# Patient Record
Sex: MALE | Race: WHITE | Employment: UNEMPLOYED | URBAN - METROPOLITAN AREA
[De-identification: names, ages, dates, MRNs, and addresses within clinical notes are randomized per-mention and may not be internally consistent; named-entity substitution may affect disease eponyms.]

---

## 2019-03-06 ENCOUNTER — APPOINTMENT (OUTPATIENT)
Dept: GENERAL RADIOLOGY | Age: 29
End: 2019-03-06
Payer: COMMERCIAL

## 2019-03-06 ENCOUNTER — HOSPITAL ENCOUNTER (EMERGENCY)
Age: 29
Discharge: HOME | End: 2019-03-06
Payer: COMMERCIAL

## 2019-03-06 VITALS
RESPIRATION RATE: 18 BRPM | WEIGHT: 160 LBS | BODY MASS INDEX: 21.2 KG/M2 | HEIGHT: 73 IN | TEMPERATURE: 98 F | SYSTOLIC BLOOD PRESSURE: 140 MMHG | OXYGEN SATURATION: 100 % | DIASTOLIC BLOOD PRESSURE: 70 MMHG | HEART RATE: 81 BPM

## 2019-03-06 DIAGNOSIS — S46.911A STRAIN OF RIGHT SHOULDER, INITIAL ENCOUNTER: Primary | ICD-10-CM

## 2019-03-06 PROCEDURE — 99283 EMERGENCY DEPT VISIT LOW MDM: CPT

## 2019-03-06 PROCEDURE — 73030 X-RAY EXAM OF SHOULDER: CPT

## 2019-03-06 RX ORDER — NAPROXEN 500 MG/1
500 TABLET ORAL 2 TIMES DAILY WITH MEALS
Qty: 20 TABLET | Refills: 0 | Status: SHIPPED | OUTPATIENT
Start: 2019-03-06 | End: 2019-05-05 | Stop reason: ALTCHOICE

## 2019-03-06 RX ORDER — CYCLOBENZAPRINE HCL 10 MG
10 TABLET ORAL 3 TIMES DAILY PRN
Qty: 10 TABLET | Refills: 0 | Status: SHIPPED | OUTPATIENT
Start: 2019-03-06 | End: 2019-05-05 | Stop reason: ALTCHOICE

## 2019-03-06 ASSESSMENT — ENCOUNTER SYMPTOMS
NAUSEA: 0
DIARRHEA: 0
SORE THROAT: 0
COLOR CHANGE: 0
RHINORRHEA: 0
SHORTNESS OF BREATH: 0
EYE PAIN: 0
VOMITING: 0
BACK PAIN: 0
ABDOMINAL PAIN: 0
EYE REDNESS: 0
COUGH: 0
EYE ITCHING: 0
TROUBLE SWALLOWING: 0
VOICE CHANGE: 0
WHEEZING: 0

## 2019-03-06 ASSESSMENT — PAIN DESCRIPTION - ONSET: ONSET: ON-GOING

## 2019-03-06 ASSESSMENT — PAIN DESCRIPTION - FREQUENCY: FREQUENCY: CONTINUOUS

## 2019-03-06 ASSESSMENT — PAIN DESCRIPTION - PAIN TYPE: TYPE: ACUTE PAIN

## 2019-03-06 ASSESSMENT — PAIN SCALES - GENERAL: PAINLEVEL_OUTOF10: 10

## 2019-03-06 ASSESSMENT — PAIN DESCRIPTION - DESCRIPTORS: DESCRIPTORS: ACHING;DISCOMFORT;CONSTANT

## 2019-03-06 ASSESSMENT — PAIN DESCRIPTION - ORIENTATION: ORIENTATION: RIGHT

## 2019-03-06 ASSESSMENT — PAIN DESCRIPTION - PROGRESSION: CLINICAL_PROGRESSION: NOT CHANGED

## 2019-05-05 ENCOUNTER — HOSPITAL ENCOUNTER (INPATIENT)
Age: 29
LOS: 5 days | Discharge: NURSING, REHAB OR LONG-TERM CARE | DRG: 753 | End: 2019-05-10
Attending: STUDENT IN AN ORGANIZED HEALTH CARE EDUCATION/TRAINING PROGRAM | Admitting: PSYCHIATRY & NEUROLOGY
Payer: COMMERCIAL

## 2019-05-05 ENCOUNTER — APPOINTMENT (OUTPATIENT)
Dept: GENERAL RADIOLOGY | Age: 29
DRG: 753 | End: 2019-05-05
Payer: COMMERCIAL

## 2019-05-05 ENCOUNTER — APPOINTMENT (OUTPATIENT)
Dept: CT IMAGING | Age: 29
DRG: 753 | End: 2019-05-05
Payer: COMMERCIAL

## 2019-05-05 DIAGNOSIS — F31.9 BIPOLAR 1 DISORDER (HCC): Primary | ICD-10-CM

## 2019-05-05 DIAGNOSIS — S09.90XA CLOSED HEAD INJURY, INITIAL ENCOUNTER: ICD-10-CM

## 2019-05-05 DIAGNOSIS — S01.81XA FACIAL LACERATION, INITIAL ENCOUNTER: ICD-10-CM

## 2019-05-05 LAB
ACETAMINOPHEN LEVEL: <5 UG/ML (ref 10–30)
ALBUMIN SERPL-MCNC: 4.8 G/DL (ref 3.5–4.6)
ALP BLD-CCNC: 55 U/L (ref 35–104)
ALT SERPL-CCNC: 24 U/L (ref 0–41)
ANION GAP SERPL CALCULATED.3IONS-SCNC: 14 MEQ/L (ref 9–15)
AST SERPL-CCNC: 33 U/L (ref 0–40)
BASOPHILS ABSOLUTE: 0 K/UL (ref 0–0.2)
BASOPHILS RELATIVE PERCENT: 0.2 %
BILIRUB SERPL-MCNC: 0.5 MG/DL (ref 0.2–0.7)
BUN BLDV-MCNC: 11 MG/DL (ref 6–20)
CALCIUM SERPL-MCNC: 8.7 MG/DL (ref 8.5–9.9)
CHLORIDE BLD-SCNC: 101 MEQ/L (ref 95–107)
CK MB: 5.4 NG/ML (ref 0–6.7)
CO2: 24 MEQ/L (ref 20–31)
CREAT SERPL-MCNC: 0.9 MG/DL (ref 0.7–1.2)
CREATINE KINASE-MB INDEX: 0.8 % (ref 0–3.5)
EOSINOPHILS ABSOLUTE: 0 K/UL (ref 0–0.7)
EOSINOPHILS RELATIVE PERCENT: 0.1 %
ETHANOL PERCENT: 0.04 G/DL
ETHANOL: 49 MG/DL (ref 0–0.08)
GFR AFRICAN AMERICAN: >60
GFR NON-AFRICAN AMERICAN: >60
GLOBULIN: 2.4 G/DL (ref 2.3–3.5)
GLUCOSE BLD-MCNC: 71 MG/DL (ref 70–99)
HCT VFR BLD CALC: 43.1 % (ref 42–52)
HEMOGLOBIN: 14.6 G/DL (ref 14–18)
LYMPHOCYTES ABSOLUTE: 1.8 K/UL (ref 1–4.8)
LYMPHOCYTES RELATIVE PERCENT: 16.3 %
MCH RBC QN AUTO: 30.2 PG (ref 27–31.3)
MCHC RBC AUTO-ENTMCNC: 34 % (ref 33–37)
MCV RBC AUTO: 88.8 FL (ref 80–100)
MONOCYTES ABSOLUTE: 0.9 K/UL (ref 0.2–0.8)
MONOCYTES RELATIVE PERCENT: 7.6 %
NEUTROPHILS ABSOLUTE: 8.6 K/UL (ref 1.4–6.5)
NEUTROPHILS RELATIVE PERCENT: 75.8 %
PDW BLD-RTO: 13.1 % (ref 11.5–14.5)
PLATELET # BLD: 271 K/UL (ref 130–400)
POTASSIUM SERPL-SCNC: 4.9 MEQ/L (ref 3.4–4.9)
RBC # BLD: 4.85 M/UL (ref 4.7–6.1)
SALICYLATE, SERUM: <0.3 MG/DL (ref 15–30)
SODIUM BLD-SCNC: 139 MEQ/L (ref 135–144)
TOTAL CK: 711 U/L (ref 0–190)
TOTAL PROTEIN: 7.2 G/DL (ref 6.3–8)
TSH SERPL DL<=0.05 MIU/L-ACNC: 1.68 UIU/ML (ref 0.44–3.86)
WBC # BLD: 11.4 K/UL (ref 4.8–10.8)

## 2019-05-05 PROCEDURE — 1240000000 HC EMOTIONAL WELLNESS R&B

## 2019-05-05 PROCEDURE — G0480 DRUG TEST DEF 1-7 CLASSES: HCPCS

## 2019-05-05 PROCEDURE — 72110 X-RAY EXAM L-2 SPINE 4/>VWS: CPT

## 2019-05-05 PROCEDURE — 70450 CT HEAD/BRAIN W/O DYE: CPT

## 2019-05-05 PROCEDURE — 85025 COMPLETE CBC W/AUTO DIFF WBC: CPT

## 2019-05-05 PROCEDURE — 71046 X-RAY EXAM CHEST 2 VIEWS: CPT

## 2019-05-05 PROCEDURE — 2500000003 HC RX 250 WO HCPCS: Performed by: STUDENT IN AN ORGANIZED HEALTH CARE EDUCATION/TRAINING PROGRAM

## 2019-05-05 PROCEDURE — 72125 CT NECK SPINE W/O DYE: CPT

## 2019-05-05 PROCEDURE — 70486 CT MAXILLOFACIAL W/O DYE: CPT

## 2019-05-05 PROCEDURE — 72170 X-RAY EXAM OF PELVIS: CPT

## 2019-05-05 PROCEDURE — 6370000000 HC RX 637 (ALT 250 FOR IP): Performed by: STUDENT IN AN ORGANIZED HEALTH CARE EDUCATION/TRAINING PROGRAM

## 2019-05-05 PROCEDURE — 84443 ASSAY THYROID STIM HORMONE: CPT

## 2019-05-05 PROCEDURE — 12015 RPR F/E/E/N/L/M 7.6-12.5 CM: CPT

## 2019-05-05 PROCEDURE — 72074 X-RAY EXAM THORAC SPINE4/>VW: CPT

## 2019-05-05 PROCEDURE — 36415 COLL VENOUS BLD VENIPUNCTURE: CPT

## 2019-05-05 PROCEDURE — 80053 COMPREHEN METABOLIC PANEL: CPT

## 2019-05-05 PROCEDURE — 82550 ASSAY OF CK (CPK): CPT

## 2019-05-05 PROCEDURE — 82553 CREATINE MB FRACTION: CPT

## 2019-05-05 PROCEDURE — 99285 EMERGENCY DEPT VISIT HI MDM: CPT

## 2019-05-05 PROCEDURE — 6370000000 HC RX 637 (ALT 250 FOR IP): Performed by: PSYCHIATRY & NEUROLOGY

## 2019-05-05 RX ORDER — TRAZODONE HYDROCHLORIDE 50 MG/1
50 TABLET ORAL NIGHTLY PRN
Status: DISCONTINUED | OUTPATIENT
Start: 2019-05-05 | End: 2019-05-10 | Stop reason: HOSPADM

## 2019-05-05 RX ORDER — VENLAFAXINE 100 MG/1
600 TABLET ORAL EVERY EVENING
Status: ON HOLD | COMMUNITY
End: 2019-05-10 | Stop reason: HOSPADM

## 2019-05-05 RX ORDER — HALOPERIDOL 5 MG/ML
5 INJECTION INTRAMUSCULAR EVERY 6 HOURS PRN
Status: DISCONTINUED | OUTPATIENT
Start: 2019-05-05 | End: 2019-05-10 | Stop reason: HOSPADM

## 2019-05-05 RX ORDER — NICOTINE 21 MG/24HR
1 PATCH, TRANSDERMAL 24 HOURS TRANSDERMAL DAILY
Status: DISCONTINUED | OUTPATIENT
Start: 2019-05-06 | End: 2019-05-10 | Stop reason: HOSPADM

## 2019-05-05 RX ORDER — NALTREXONE HYDROCHLORIDE 50 MG/1
50 TABLET, FILM COATED ORAL DAILY
Status: ON HOLD | COMMUNITY
End: 2019-05-10 | Stop reason: HOSPADM

## 2019-05-05 RX ORDER — AMOXICILLIN AND CLAVULANATE POTASSIUM 875; 125 MG/1; MG/1
1 TABLET, FILM COATED ORAL EVERY 12 HOURS SCHEDULED
Status: DISCONTINUED | OUTPATIENT
Start: 2019-05-05 | End: 2019-05-10 | Stop reason: HOSPADM

## 2019-05-05 RX ORDER — DIPHENHYDRAMINE HCL 50 MG
50 CAPSULE ORAL EVERY 6 HOURS PRN
Status: DISCONTINUED | OUTPATIENT
Start: 2019-05-05 | End: 2019-05-10 | Stop reason: HOSPADM

## 2019-05-05 RX ORDER — OXCARBAZEPINE 300 MG/1
300 TABLET, FILM COATED ORAL DAILY
Status: DISCONTINUED | OUTPATIENT
Start: 2019-05-06 | End: 2019-05-10 | Stop reason: HOSPADM

## 2019-05-05 RX ORDER — AMOXICILLIN AND CLAVULANATE POTASSIUM 875; 125 MG/1; MG/1
1 TABLET, FILM COATED ORAL EVERY 12 HOURS SCHEDULED
Status: CANCELLED | OUTPATIENT
Start: 2019-05-05

## 2019-05-05 RX ORDER — ACETAMINOPHEN 325 MG/1
650 TABLET ORAL EVERY 4 HOURS PRN
Status: DISCONTINUED | OUTPATIENT
Start: 2019-05-05 | End: 2019-05-10 | Stop reason: HOSPADM

## 2019-05-05 RX ORDER — VENLAFAXINE HYDROCHLORIDE 150 MG/1
150 TABLET, EXTENDED RELEASE ORAL
Status: ON HOLD | COMMUNITY
End: 2019-05-10 | Stop reason: HOSPADM

## 2019-05-05 RX ORDER — LIDOCAINE HYDROCHLORIDE AND EPINEPHRINE 10; 10 MG/ML; UG/ML
20 INJECTION, SOLUTION INFILTRATION; PERINEURAL ONCE
Status: COMPLETED | OUTPATIENT
Start: 2019-05-05 | End: 2019-05-05

## 2019-05-05 RX ORDER — DIPHENHYDRAMINE HYDROCHLORIDE 50 MG/ML
50 INJECTION INTRAMUSCULAR; INTRAVENOUS EVERY 6 HOURS PRN
Status: DISCONTINUED | OUTPATIENT
Start: 2019-05-05 | End: 2019-05-10 | Stop reason: HOSPADM

## 2019-05-05 RX ORDER — HYDROCODONE BITARTRATE AND ACETAMINOPHEN 5; 325 MG/1; MG/1
2 TABLET ORAL ONCE
Status: COMPLETED | OUTPATIENT
Start: 2019-05-05 | End: 2019-05-05

## 2019-05-05 RX ORDER — AMOXICILLIN AND CLAVULANATE POTASSIUM 875; 125 MG/1; MG/1
1 TABLET, FILM COATED ORAL ONCE
Status: COMPLETED | OUTPATIENT
Start: 2019-05-05 | End: 2019-05-05

## 2019-05-05 RX ORDER — HALOPERIDOL 5 MG
5 TABLET ORAL EVERY 6 HOURS PRN
Status: DISCONTINUED | OUTPATIENT
Start: 2019-05-05 | End: 2019-05-10 | Stop reason: HOSPADM

## 2019-05-05 RX ORDER — HYDROXYZINE PAMOATE 50 MG/1
50 CAPSULE ORAL 3 TIMES DAILY PRN
Status: ON HOLD | COMMUNITY
End: 2019-05-10 | Stop reason: HOSPADM

## 2019-05-05 RX ORDER — OXCARBAZEPINE 300 MG/1
300 TABLET, FILM COATED ORAL DAILY
Status: ON HOLD | COMMUNITY
End: 2019-05-10 | Stop reason: SDUPTHER

## 2019-05-05 RX ADMIN — HYDROCODONE BITARTRATE AND ACETAMINOPHEN 2 TABLET: 5; 325 TABLET ORAL at 09:33

## 2019-05-05 RX ADMIN — DIPHENHYDRAMINE HYDROCHLORIDE 50 MG: 50 CAPSULE ORAL at 18:43

## 2019-05-05 RX ADMIN — LIDOCAINE HYDROCHLORIDE,EPINEPHRINE BITARTRATE 20 ML: 10; .01 INJECTION, SOLUTION INFILTRATION; PERINEURAL at 11:13

## 2019-05-05 RX ADMIN — ACETAMINOPHEN 650 MG: 325 TABLET ORAL at 18:43

## 2019-05-05 RX ADMIN — AMOXICILLIN AND CLAVULANATE POTASSIUM 1 TABLET: 875; 125 TABLET, FILM COATED ORAL at 09:33

## 2019-05-05 RX ADMIN — AMOXICILLIN AND CLAVULANATE POTASSIUM 1 TABLET: 875; 125 TABLET, FILM COATED ORAL at 21:16

## 2019-05-05 ASSESSMENT — ENCOUNTER SYMPTOMS
SINUS PRESSURE: 0
CHEST TIGHTNESS: 0
COUGH: 0
BACK PAIN: 0
SHORTNESS OF BREATH: 0
VOMITING: 0
DIARRHEA: 0
TROUBLE SWALLOWING: 0
ABDOMINAL PAIN: 0

## 2019-05-05 ASSESSMENT — PAIN DESCRIPTION - LOCATION
LOCATION: FACE;EYE
LOCATION: FACE;EYE
LOCATION: OTHER (COMMENT)

## 2019-05-05 ASSESSMENT — PAIN SCALES - GENERAL
PAINLEVEL_OUTOF10: 4
PAINLEVEL_OUTOF10: 10
PAINLEVEL_OUTOF10: 6
PAINLEVEL_OUTOF10: 10
PAINLEVEL_OUTOF10: 10
PAINLEVEL_OUTOF10: 2
PAINLEVEL_OUTOF10: 8

## 2019-05-05 ASSESSMENT — SLEEP AND FATIGUE QUESTIONNAIRES
DO YOU USE A SLEEP AID: YES
RESTFUL SLEEP: YES
DIFFICULTY STAYING ASLEEP: YES
DIFFICULTY ARISING: NO
AVERAGE NUMBER OF SLEEP HOURS: 8
DO YOU HAVE DIFFICULTY SLEEPING: YES
DIFFICULTY FALLING ASLEEP: NO

## 2019-05-05 ASSESSMENT — PAIN DESCRIPTION - FREQUENCY
FREQUENCY: INTERMITTENT
FREQUENCY: CONTINUOUS

## 2019-05-05 ASSESSMENT — PAIN DESCRIPTION - ONSET: ONSET: ON-GOING

## 2019-05-05 ASSESSMENT — PAIN SCALES - WONG BAKER: WONGBAKER_NUMERICALRESPONSE: 2

## 2019-05-05 ASSESSMENT — PAIN DESCRIPTION - ORIENTATION
ORIENTATION: LEFT
ORIENTATION: RIGHT
ORIENTATION: RIGHT

## 2019-05-05 ASSESSMENT — PAIN DESCRIPTION - DESCRIPTORS: DESCRIPTORS: THROBBING;CONSTANT

## 2019-05-05 ASSESSMENT — PAIN DESCRIPTION - PROGRESSION: CLINICAL_PROGRESSION: NOT CHANGED

## 2019-05-05 ASSESSMENT — PAIN DESCRIPTION - PAIN TYPE
TYPE: ACUTE PAIN

## 2019-05-05 NOTE — ED TRIAGE NOTES
Patient has c/o fall injury to right head, face,  eye. Patient states he fell three feet out of a window.  at bedside.

## 2019-05-05 NOTE — ED NOTES
A Dr. Kenney Brower MD called to [facility] and wanted fax number to where the pt would be admitted to, Dr. Kaiser Qureshi was given the name of Dr. Deonte Brandt and a number he could call to talk with him 5/6/19 as Dr. Fior Cisneros is gone for the day. Limited information was accepted from Dr. Kaiser Qureshi but he will fax to [facility] information on the pt that he feels is relevant to his current care, the doctor feels pt needs admitted. Explained could not discuss but he will fax information on the pt to [facility], fax number was given to the doctor.   Dr. Kaiser Qureshi is going to call Dr. Deonte Brandt on 5/6/19  Doctor is from [place] [phone]  Will call Dr. Fior Cisneros at home as she has left [facility] for home per [facility] staff     Gentry Ferreira RN  05/05/19 3214
Bed request sent on the pt for a bed room 390 bed request sent to the [address] [place] [address], RN  05/05/19 4726
Called [facility] to find out of the doctor was still on [facility] and Dr. Elda Pacheco went home will need to call her at home     Nabil Perez RN  05/05/19 4934
Holly Estrada from [facility] called the [place] and wanted to know if pt could be evaluated for psych, pt's father has talked with the [facility] hotline.   Referred [place] to talk with the doctor in zone one and advised to let the doctor know what the father had advised [facility] on their hotline earlier this Efrem Guillen stated he would call the doctor gave him the number to call     Giulia Aguilera RN  05/05/19 8826
Orders put in on the pt called [facility] talked with staff and was given a bed but unable to take a report currently die to no staff available to take the report  Will call back to [facility] to take a report on the pt     Caridad Armendariz RN  05/05/19 4080
Patient is sitting up in bed alert and speaking clear. Patient updated on plan of care and agrees.       Christian Mathew RN  05/05/19 9656
Patient placed in c collar.      Cici Palacios RN  05/05/19 9111
Patient sitting up in bed alert nurse update dated on plan of care wound care provided. Family at bedside.       Tiffany Haji RN  05/05/19 4570
Patients father at bedside voiced concerns about patient coming home. Per father patient has bipolar and has told him he was going to be found hanging in the garage. Per father he was found in bed with injuries to his face patient stated  He fell and hit his head.       Cici Palacios RN  05/05/19 3576
Patients father at bedside.       Jose Combs RN  05/05/19 1350
Pt is awaiting his transfer to [facility].  Talked with Florinda regarding pt's admit, explained medications that Dr. Elijah Busch renewed, from home medications his Trileptal and other med's were discontinued He was started on Oxazepine  5 mg oral at bedtime. Reviewed all labs and medications given in ER, reviewed his Augmentin started in ER and continued  Haldol, Benadryl, and Trazodone PRN's DX and insurance reviewed  [employer] [facility] for 9 months, number and name of Dr. Jeannie Herrera whom is faxing information for Dr. Jose Armando May to review Dr. Jeannie Herrera will fax information on pt reviewed number for the doctor [phone] Reviewed all of his 7 x-rays and CT reviewed his right eye injury and he does not remember how it happened awakened in bed in AM with blood all over  He has stitches and all test just revealed he has a hematoma over right eye and stitches to his right eyebrow area.        Jaki Herrera RN  05/05/19 3093
Pt is aware of his admit to [facility] and explained to him again about the pink sheet and he has to see the psychiatrist on the floor before he can be D/C home  Pt wanted to know if he could make an appointment to talk with him  Explained reason why that wouldn't happen and explained he would see the psychiatrist tomorrow morning sometime and the 72 hour would be between him and the psychiatrist did not have to be the 72 hours or possibly could be longer was up to the pt and the psychiatrist as to when he would be D/C Pt had some understanding on the pink sheet. Pt's mother came to the [facility] and pt saw his mother but would not give her his HIPPA code which he had been given earlier, he talked with her briefly and then asked her to leave  No information was given to his mother on the pt as pt requested.      Janelle Allison, STEPHANY  05/05/19 6478 Rick Kirk RN  05/05/19 0051
Pt is in [facility] he was changed into -ER clothing before his arrival to the [facility]. Pt came to [facility] with security and is in bed-1 getting vital signs taken. He is quiet and cooperative  Will start pt's assessments with him in the [facility] office after he gets his vital signs completed.      Troy Jansen RN  05/05/19 9881
Pt is in bed area quiet and cooperative with no problems or C/O any kind noted or expressed.      Jo Ann Horne RN  05/05/19 9802
Pt is still not giving a urine and will go to [facility] without the urine ? Dr. Amee Dahl was okay with this and fact pt has refused to give urine.      Andrés Donis RN  05/05/19 2556
Pt left with security and -ER staff via a W/C with all of his belongings returned from security for his admit to [facility]  Pt is on a pink sheet to the [facility] unit     Robert Valdez RN  05/05/19 1800
Pt remains on the pink sheet from the ER doctor     Serene Menezes, STEPHANY  05/05/19 8816
Pt was loud when on the phone but was able to calm down after he got off the phone  Pt is aware of calling the on call doctor for his disposition and possible admit to unit and or D/C home. Did explain pt has a pink sheet and is a hold, explained he has to see a psychiatrist before D/C home  Pt is currently resting in bed area with no problems or C/O any kind noted or expressed.   Will verify his medications and consult with the on call doctor       Janelle Allison RN  05/05/19 3018
Reviewed with pt his CT of Cervical Spine, facial bones, and head C-T all came back normal with no abnormalities noted. He does have facial swelling with a hematoma to hir right eye.  Pt did receive pain medications in main ER before his arrival to [facility], RN  05/05/19 0187
Reviewed with pt his labs that were back and gave him the medications that he had received in main ER and reason for the medications. Pt wanted to call his mother so stopped his assessments so he could call her. Pt requested to review his x-rays they have completed. Pt is on the phone with his mother.        Troy Jansen RN  05/05/19 4299
and swelling to right facial area with no fractures, there are four test that remain not back or in the chart currently. Pt denies any H/O suicide attempts but a week ago he vaguely remember making a statement that he wanted to hang himself and a week ago made a statement about holding a knife to his mother's throat which he states she misunderstood what he was saying.   Pt has no current S/H/I and no H/O suicide attempts per pt  No delusions, no phobia's and no other psychosis noted      Level of Care Disposition:  Will consult with on call psychiatrist  Pt has [employer]    Per Dr Gentry Ferreira, RN  05/05/19 Mando Dhaliwal, RN  05/05/19 [phone]
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
## 2019-05-06 LAB
EKG ATRIAL RATE: 61 BPM
EKG P AXIS: -2 DEGREES
EKG P-R INTERVAL: 118 MS
EKG Q-T INTERVAL: 422 MS
EKG QRS DURATION: 86 MS
EKG QTC CALCULATION (BAZETT): 424 MS
EKG R AXIS: 64 DEGREES
EKG T AXIS: 58 DEGREES
EKG VENTRICULAR RATE: 61 BPM

## 2019-05-06 PROCEDURE — 93005 ELECTROCARDIOGRAM TRACING: CPT

## 2019-05-06 PROCEDURE — 1240000000 HC EMOTIONAL WELLNESS R&B

## 2019-05-06 PROCEDURE — 6370000000 HC RX 637 (ALT 250 FOR IP): Performed by: PSYCHIATRY & NEUROLOGY

## 2019-05-06 PROCEDURE — 99222 1ST HOSP IP/OBS MODERATE 55: CPT | Performed by: PSYCHIATRY & NEUROLOGY

## 2019-05-06 PROCEDURE — 93010 ELECTROCARDIOGRAM REPORT: CPT | Performed by: INTERNAL MEDICINE

## 2019-05-06 RX ORDER — OLANZAPINE 5 MG/1
5 TABLET ORAL NIGHTLY
Status: DISCONTINUED | OUTPATIENT
Start: 2019-05-06 | End: 2019-05-08

## 2019-05-06 RX ADMIN — TRAZODONE HYDROCHLORIDE 50 MG: 50 TABLET ORAL at 21:06

## 2019-05-06 RX ADMIN — HALOPERIDOL 5 MG: 5 TABLET ORAL at 16:40

## 2019-05-06 RX ADMIN — OLANZAPINE 5 MG: 5 TABLET, FILM COATED ORAL at 21:06

## 2019-05-06 RX ADMIN — OXCARBAZEPINE 300 MG: 300 TABLET, FILM COATED ORAL at 09:22

## 2019-05-06 RX ADMIN — AMOXICILLIN AND CLAVULANATE POTASSIUM 1 TABLET: 875; 125 TABLET, FILM COATED ORAL at 21:06

## 2019-05-06 RX ADMIN — ACETAMINOPHEN 650 MG: 325 TABLET ORAL at 16:40

## 2019-05-06 RX ADMIN — AMOXICILLIN AND CLAVULANATE POTASSIUM 1 TABLET: 875; 125 TABLET, FILM COATED ORAL at 09:22

## 2019-05-06 RX ADMIN — ACETAMINOPHEN 650 MG: 325 TABLET ORAL at 09:41

## 2019-05-06 RX ADMIN — DIPHENHYDRAMINE HYDROCHLORIDE 50 MG: 50 CAPSULE ORAL at 16:40

## 2019-05-06 ASSESSMENT — PAIN SCALES - GENERAL
PAINLEVEL_OUTOF10: 5
PAINLEVEL_OUTOF10: 5
PAINLEVEL_OUTOF10: 6

## 2019-05-06 ASSESSMENT — LIFESTYLE VARIABLES: HISTORY_ALCOHOL_USE: NO

## 2019-05-06 NOTE — PROGRESS NOTES
Pt. declined to attend the 0900 community meeting, despite staff encouragement.  Electronically signed by Vamsi Ann on 5/6/2019 at 9:30 AM

## 2019-05-06 NOTE — PROGRESS NOTES
Pt. refused to attend the 1000 skills group, despite staff encouragement.  Electronically signed by Daryl Deutsch on 5/6/2019 at 12:13 PM
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
## 2019-05-06 NOTE — PROGRESS NOTES
Pt laying in bed upon my contact. He was cooperative , poor eye contact and somewhat guarded during our conversation. Physical bruising on face and arms visibile. He was able to open right eye although it was very swollen. Poor historian on what actually happened to cause these injuries. He denies S/I.H/I. A/V hallucinations to this writer. He endorsed + appetite and states he is \" sleeping ok\". + BM 5/6/19  He anticipates being able to be discharged by tomorrow. Pt encouraged to attend groups and verbalized understanding.

## 2019-05-06 NOTE — PROGRESS NOTES
Patient noted to be pacing briskly down hallway/ slamming phone down/ overheard making threatening statements about mother allowing him to return home. Wanting to go home on Wednesday.   Voicing much frustration and irritation with family  Accepted offered medication of haldol/benadryl/tylenol for pain all over

## 2019-05-06 NOTE — PROGRESS NOTES
Patient has multiple abrasions on bilateral hands/elbows/arms/legs/knees. Pt has superficial laceration on right cheek and one on nose. Pt has stitches near right eyebrow. Pt has a swollen right eye. Pt has bruising on eyes/nose. No s/s of infection seen. Pt states he does not know where all of this came from.  Electronically signed by Devon Linn RN on 5/6/19 at 10:31 AM

## 2019-05-06 NOTE — PROGRESS NOTES
Pt. presents resting in bed. Poor eye contact. Has a swollen and discolored R eye, with stitches in the brow. Reports coming to the ER \"to get stitches in eye brow. That's all I know. I fell out of a window. \"   Quick, brief responses. Vague with details. Denies the need to be here. Lives with parents and reports med compliance. Was to start a [employer] job today. Denies AH/VH and has no si/hi. Denies ETOH and does not smoke marijuana or use any drugs.  Stressors include  1.money  2.weather  3.travel  *fish, watch video games,  watch sports, draw  Electronically signed by Bebe Cheadle on 5/6/2019 at 8:34 AM

## 2019-05-06 NOTE — GROUP NOTE
Group Therapy Note    Date: May 5    Group Start Time: 2050  Group End Time: 2105  Group Topic: Wrap-Up    [facility] 3[facility] VALERIEI    Valerie Santiago        Group Therapy Note    Attendees: 7    Pt did not attend group

## 2019-05-06 NOTE — CARE COORDINATION
BHI Biopsychosocial Assessment    Current Level of Psychosocial Functioning     Independent x  Dependent    Minimal Assist     Comments:  Patient is a single 28 y/o male who resides with his father. 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 Psychosocial High Risk Factors (check all that apply)    Unable to obtain meds   Chronic illness/pain    Substance abuse   Lack of Family Support   Financial stress   Isolation   Inadequate Community Resources  Suicide attempt(s)  Not taking medications   Victim of crime   Developmental Delay  Unable to manage personal needs    Age 72 or older   Homeless  No transportation   Readmission within 30 days  Unemployment x  Traumatic Event    Comments:   Sexual Orientation:  heterosexual    Patient Strengths: family support    Patient Barriers: lacks insight and is not a good source of information    Plan of Care: Medication management, group/individual therapies, family meetings, psycho -education, treatment team meetings to assist with stabilization    Initial Discharge Plan: Will return home and follow recommendations of in-patient team    Clinical Summary:  SW met with Patient in his room. Patient states he is unable to recall the reason for his admission to the hospital and he could not explain the source of his bruising and cuts. Patient said he recieves out patient mental health services from the [facility] in [place]. Patient said he takes all prescribed medications. Patient said the claims outlined to ED note are untrue. Patient went on to say he has never been suicidal and does not want to hurt anyone. Patient denies any suicidal/homocidal thoughts.

## 2019-05-06 NOTE — PROGRESS NOTES
Patient denies current SI/HI or AVH. Pt states he is dep and anx, but does not quantify. Pt states, \"I feel like I am claravouyant at times, I had a dream one time that someone was on a big waterslide and then I saw on the news that this really happened. \"  Pt states that sometimes he foresees things and is disturbed by the school shootings and\" feel this world is going to [place]. \" Pt states he lives in [place] and states that his doctors are in [place]. Pt also states that he has been to [employer]. Pt states, \"when I was younger I should have went to college and played basketball or something I made the wrong choices and did the wrong things. \"  Pt states \"I worked at [employer] and was working 40 hours, then was let go. \"  Patient states he feels he is trying to do better, but always gets the blame for everything. Pt exhibits paranoia. Patient does not look at this writer, he looks out the window while talking. Pt feels that he sleep walks and just cannot remember what happened and how he ended up here. Pt states he feels he \"fell while sleepwalking. \"  Patient states I am probably homeless. Pt drivers license says address in [place], [place]. Pt does talk about being at another [facility], in another town before.  Electronically signed by Pawel Rhoades LPN on 4/5/0923 at 35:01 PM

## 2019-05-06 NOTE — H&P
[facility]  MEDICINE    HISTORY AND PHYSICAL EXAM    PATIENT NAME:  Calixto Galvan    MRN:  [id number]  SERVICE DATE:  5/6/2019   SERVICE TIME:  11:09 AM    Primary Care Physician: No primary care provider on file. SUBJECTIVE  CHIEF COMPLAINT:  Medical clear for inpatient psychiatry admission. Consult for medical H/P encounter. HPI:  This is a 29 y.o. male who is admitted to [address] [place] [address] for worsening symptoms of bipolar disorder with thoughts of suicidal ideations for weeks pta. Seen with RN in room. Patient has multiple bruising to the face and extremities. Has 3 lacerations to the right eyebrow and periorbital ecchymosis. He is giving multiple explanations to why he has these. Denies cp sob ha rash anx n v d f     PAST MEDICAL HISTORY:    Past Medical History:   Diagnosis Date    Asthma     Headache      PAST SURGICAL HISTORY:  History reviewed. No pertinent surgical history. FAMILY HISTORY:  History reviewed. No pertinent family history. SOCIAL HISTORY:    Social History     Socioeconomic History    Marital status: Single     Spouse name: Not on file    Number of children: Not on file    Years of education: Not on file    Highest education level: Not on file   Occupational History    Not on file   Social Needs    Financial resource strain: Not on file    Food insecurity:     Worry: Not on file     Inability: Not on file    Transportation needs:     Medical: Not on file     Non-medical: Not on file   Tobacco Use    Smoking status: Current Some Day Smoker     Packs/day: 0.50     Years: 10.00     Pack years: 5.00     Types: Cigarettes     Start date: 4/11/2006    Smokeless tobacco: Former User     Types: Chew   Substance and Sexual Activity    Alcohol use:  Yes     Alcohol/week: 1.2 oz     Types: 2 Glasses of wine per week     Comment: Not daily and occasionally    Drug use: Not Currently     Comment: H/O cocaine and THC none in 4-6 months per pt    Sexual activity: Not on file     Comment: Pt refused to answer   Lifestyle    Physical activity:     Days per week: Not on file     Minutes per session: Not on file    Stress: Not on file   Relationships    Social connections:     Talks on phone: Not on file     Gets together: Not on file     Attends Spiritism service: Not on file     Active member of club or organization: Not on file     Attends meetings of clubs or organizations: Not on file     Relationship status: Not on file    Intimate partner violence:     Fear of current or ex partner: Not on file     Emotionally abused: Not on file     Physically abused: Not on file     Forced sexual activity: Not on file   Other Topics Concern    Not on file   Social History Narrative    Not on file     MEDICATIONS:    Current Facility-Administered Medications   Medication Dose Route Frequency Provider Last Rate Last Dose    OXcarbazepine (TRILEPTAL) tablet 300 mg  300 mg Oral Daily Linh Yepez MD   300 mg at 05/06/19 0922    acetaminophen (TYLENOL) tablet 650 mg  650 mg Oral Q4H PRN Linh Yepez MD   650 mg at 05/06/19 0941    magnesium hydroxide (MILK OF MAGNESIA) 400 MG/5ML suspension 30 mL  30 mL Oral Daily PRN Linh Yepez MD        nicotine (NICODERM CQ) 21 MG/24HR 1 patch  1 patch Transdermal Daily Linh Yepez MD   1 patch at 05/06/19 0922    haloperidol lactate (HALDOL) injection 5 mg  5 mg Intramuscular Q6H PRN Rosa Isela Mcneil MD        Or    haloperidol (HALDOL) tablet 5 mg  5 mg Oral Q6H PRN Linh Yepez MD        diphenhydrAMINE (BENADRYL) injection 50 mg  50 mg Intramuscular Q6H PRN Rosa Isela Mcneil MD        Or    diphenhydrAMINE (BENADRYL) capsule 50 mg  50 mg Oral Q6H PRN Linh Yepez MD   50 mg at 05/05/19 1843    traZODone (DESYREL) tablet 50 mg  50 mg Oral Nightly PRN Linh Yepez MD        amoxicillin-clavulanate (AUGMENTIN) 875-125 MG per tablet 1 tablet  1 tablet Oral 2 times per day Alex Kelly MD   1 tablet at 05/06/19 7088       ALLERGIES: Patient has no known allergies. REVIEW OF SYSTEM:   ROS as noted in HPI, 12 point ROS reviewed and otherwise negative. OBJECTIVE  PHYSICAL EXAM: /77   Pulse 91   Temp 98.4 °F (36.9 °C)   Resp 18   Ht 6' 1\" (1.854 m)   Wt 180 lb (81.6 kg)   SpO2 96%   BMI 23.75 kg/m²   CONSTITUTIONAL:  awake, alert, cooperative, no apparent distress, and appears stated age  EYES:  Lids and lashes normal, pupils equal, round and reactive to light, extra ocular muscles intact, sclera clear, conjunctiva normal  ENT:  Normocephalic, without obvious abnormality, atraumatic, sinuses nontender on palpation, external ears without lesions, oral pharynx with moist mucus membranes, tonsils without erythema or exudates, gums normal and good dentition. NECK:  Supple, symmetrical, trachea midline, no adenopathy, thyroid symmetric, not enlarged and no tenderness, skin normal  LUNGS:  No increased work of breathing, good air exchange, clear to auscultation bilaterally, no crackles or wheezing  CARDIOVASCULAR:  Normal apical impulse, regular rate and rhythm, normal S1 and S2, no S3 or S4, and no murmur noted  ABDOMEN:  No scars, normal bowel sounds, soft, non-distended, non-tender, no masses palpated, no hepatosplenomegally  MUSCULOSKELETAL:  There is no redness, warmth, or swelling of the joints. Full range of motion noted. Motor strength is 5 out of 5 all extremities bilaterally. Tone is normal.  NEUROLOGIC:  Awake, alert, oriented to name, place and time. Cranial nerves II-XII are grossly intact. Motor is 5 out of 5 bilaterally. Cerebellar finger to nose, heel to shin intact. Sensory is intact.   Babinski down going, Romberg negative, and gait is normal.  SKIN:  no bruising or bleeding, normal skin color, texture, turgor, no redness, warmth, or swelling and no jaundice    DATA:     Diagnostic tests reviewed for today's visit: Most recent labs and imaging results reviewed. VTE Prophylaxis:     ASSESSMENT AND PLAN  Patient Active Hospital Problem List:   Bipolar 1 disorder ([facility] [address] (5/5/2019)    Assessment:     Plan:   Asthma  Tobacco abuse  Hx of cocaine and THC abuse        This is only a history and physical examination and not medical management. The patient is to contact and follow up with their primary care physician and go over any abnormal labs, imaging, findings, medical concerns, or conditions that we have and have not addressed during this encounter.     Plan of care discussed with: patient    SIGNATURE: RUDDY Patel  DATE: May 6, 2019  TIME: 11:09 AM

## 2019-05-06 NOTE — H&P
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 Pt is not currently working anywhere. Pt came into the ER with his father from home, he was found in bed by his father covered in blood with blood throughout the house and outside. Pt first stated that he fell out a window but then stated  \"I woke up in bed like this not sure what happened to me. \"  Pt has had 7 test completed x-rays and CT scans on chest, head, spine, and stitches to right eye near eyebrow are test results back just show a hematoma and swelling to right facial area with no fractures, there are four test that remain not back or in the chart currently. Pt denies any H/O suicide attempts but a week ago he vaguely remember making a statement that he wanted to hang himself and a week ago made a statement about holding a knife to his mother's throat which he states she misunderstood what he was saying.   Pt has no current S/H/I and no H/O suicide attempts per pt  No delusions, no phobia's and no other psychosis noted        HISTORY OF PRESENT ILLNESS:      The patient is a 29 y.o. male with significant past history of bipolar disorder and addiction    Pt could not tell me the circumstances of admission  Pt live with his parents  Pt does not recall the circumstances of his trauma  Pt not interested in talking  Labile and irritable  Had physical exam which was normal  Denies any active pain    Stressors:Lacks housing. Mother feels Patient's housing has been unstable and somehow contributes to his mental illness and drug use. Mother said Patient struggles with addiction and mental illness. This has been the case for the past 10 years. Mother said Patient has been diagnosised with bi-polar. Mother feels as if Patient recently relapsed due to his bizarre behavior. Mother said, \"somehting terrible happened\" this past weekend referring to his cuts and bruises. Mother feels as if this is somehow drug related. Mother said Patient did not fall out of a window as he claims. Mother said Patient makes false claims when he is actively psychotic    Patient was [facility] at the [facility] until December 2018 but was kicked out. He was at [facility] prior to [facility]. He has been bouncing back and forth from his mother's home in [place] and his father's home in [place]. Mother sighted treatment facilty in [place] where she feels Patient could receive care and assistance with housing. Mother said Patient was a client at the [facility] in [facility] where he had a case manger named Paulina Bowman. The patient is not currently receiving care for the above psychiatric illness.     Medications Prior to Admission:   Medications Prior to Admission: hydrOXYzine (VISTARIL) 50 MG capsule, Take 50 mg by mouth 3 times daily as needed for Anxiety  OXcarbazepine (TRILEPTAL) 300 MG tablet, Take 300 mg by mouth daily  venlafaxine 150 MG extended release tablet, Take 150 mg by mouth daily (with breakfast)  venlafaxine (EFFEXOR) 100 MG tablet, Take 600 mg by mouth every evening  naltrexone (DEPADE) 50 MG tablet, Take 50 mg by mouth daily    Compliance:no    Psychiatric Review of Systems       Depression: yes     Kathy or Hypomania:  no     Panic Attacks:  no     Phobias:  no     Obsessions and Compulsions:  no     PTSD : no     Hallucinations:  no     Delusions:  no    Substance Abuse History:  ETOH: 2 drinks disturbance  Cognition:  oriented to person, place, and time   Concentration poor  Memory intact  Insight poor   Judgement poor   Fund of Knowledge limited    Mini Mental Status 30/30      DIAGNOSIS:     Bipolar I disorder; mixed epiosde   alcohol dependence, r/o w/d           RISK ASSESSMENT:    SUICIDE RISK ASSESSMENT: moderate  HOMICIDE: moderate  AGITATION/VIOLENCE: highi  ELOPEMENT: low    LABS: REVIEWED TODAY:  Recent Labs     05/05/19  1133   WBC 11.4*   HGB 14.6        Recent Labs     05/05/19  1132      K 4.9      CO2 24   BUN 11   CREATININE 0.90   GLUCOSE 71     Recent Labs     05/05/19  1132   BILITOT 0.5   ALKPHOS 55   AST 33   ALT 24     Lab Results   Component Value Date    ETOH 49 05/05/2019     Lab Results   Component Value Date    TSH 1.680 05/05/2019     No results found for: LITHIUM  No results found for: VALPROATE, CBMZ  No results found for: LITHIUM, VALPROATE    FURTHER LABS ORDERED :      Radiology   Xr Chest Standard (2 Vw)    Result Date: 5/5/2019  XR CHEST (2 VW): 5/5/2019 CLINICAL HISTORY:  fall down stairs . COMPARISON: None available. Upright PA and lateral radiographs of the chest were obtained. FINDINGS: There is no pulmonary infiltrate, significant pleural effusion, vascular congestion, pneumothorax, or displaced fractures identified. The cardiac and mediastinal silhouettes appear within normal limits for technique. NO EVIDENCE OF SIGNIFICANT THORACIC TRAUMA OR ACTIVE CARDIOPULMONARY DISEASE IDENTIFIED. Xr Lumbar Spine (min 4 Views)    Result Date: 5/5/2019  XR THORACIC SPINE (MIN 4 VIEWS), XR LUMBAR SPINE (MIN 4 VIEWS) : 5/5/2019 CLINICAL HISTORY:  fall down stairs . COMPARISON: None available. TECHNIQUE: AP, lateral, and swimmer's lateral radiographs of the thoracic spine, and AP, lateral, oblique, coned-down AP and lateral radiographs of the lumbar spine were obtained.  FINDINGS: Mild chronic or developmental wedging of the T7-8 vertebral bodies is noted ACUTE INTRACRANIAL PROCESS, FRACTURE, EVIDENCE OF CERVICAL SPINE INJURY, OR OTHER SIGNIFICANT SOFT TISSUE COMPLICATION IDENTIFIED. Ct Facial Bones Wo Contrast    Result Date: 5/5/2019  HEAD, FACE AND CERVICAL SPINE CTs  WITHOUT CONTRAST: 5/5/2019 8:30 AM CLINICAL HISTORY:  fell down stairs, swelling to right orbit r/o skull fx and or acute traumatic injury COMPARISON: None available. TECHNIQUE: ROUTINE All CT scans at this facility use dose modulation, iterative reconstruction, and/or weight based dosing when appropriate to reduce radiation dose to as low as reasonably achievable. HEAD CT FINDINGS: Right periorbital and forehead soft tissue swelling/hematoma is noted. There is no intracranial hemorrhage, mass effect, midline shift, extra-axial collection, hydrocephalus, evidence of a recent or remote ischemic infarct or skull fracture identified. FACE CT FINDINGS: Right periorbital and forehead soft tissue swelling/hematoma is noted. There is no fracture, significant paranasal sinus opacification, or other soft tissue complication identified. The optic globes, orbits, temporomandibular joints and mandible are intact. CERVICAL SPINE CT FINDINGS: The spine is visualized from the craniovertebral junction through the T3-4  level. There is no fracture, significant subluxation or paraspinous soft tissue abnormalities identified. FINAL IMPRESSION: RIGHT PERIORBITAL/FOREHEAD SOFT TISSUE SWELLING/HEMATOMA. NO ACUTE INTRACRANIAL PROCESS, FRACTURE, EVIDENCE OF CERVICAL SPINE INJURY, OR OTHER SIGNIFICANT SOFT TISSUE COMPLICATION IDENTIFIED. Ct Cervical Spine Wo Contrast    Result Date: 5/5/2019  HEAD, FACE AND CERVICAL SPINE CTs  WITHOUT CONTRAST: 5/5/2019 8:30 AM CLINICAL HISTORY:  fell down stairs, swelling to right orbit r/o skull fx and or acute traumatic injury COMPARISON: None available.  TECHNIQUE: ROUTINE All CT scans at this facility use dose modulation, iterative reconstruction, and/or weight based dosing when appropriate to reduce radiation dose to as low as reasonably achievable. HEAD CT FINDINGS: Right periorbital and forehead soft tissue swelling/hematoma is noted. There is no intracranial hemorrhage, mass effect, midline shift, extra-axial collection, hydrocephalus, evidence of a recent or remote ischemic infarct or skull fracture identified. FACE CT FINDINGS: Right periorbital and forehead soft tissue swelling/hematoma is noted. There is no fracture, significant paranasal sinus opacification, or other soft tissue complication identified. The optic globes, orbits, temporomandibular joints and mandible are intact. CERVICAL SPINE CT FINDINGS: The spine is visualized from the craniovertebral junction through the T3-4  level. There is no fracture, significant subluxation or paraspinous soft tissue abnormalities identified. FINAL IMPRESSION: RIGHT PERIORBITAL/FOREHEAD SOFT TISSUE SWELLING/HEMATOMA. NO ACUTE INTRACRANIAL PROCESS, FRACTURE, EVIDENCE OF CERVICAL SPINE INJURY, OR OTHER SIGNIFICANT SOFT TISSUE COMPLICATION IDENTIFIED. Xr Thoracic Spine (min 4 Views)    Result Date: 5/5/2019  XR THORACIC SPINE (MIN 4 VIEWS), XR LUMBAR SPINE (MIN 4 VIEWS) : 5/5/2019 CLINICAL HISTORY:  fall down stairs . COMPARISON: None available. TECHNIQUE: AP, lateral, and swimmer's lateral radiographs of the thoracic spine, and AP, lateral, oblique, coned-down AP and lateral radiographs of the lumbar spine were obtained. FINDINGS: Mild chronic or developmental wedging of the T7-8 vertebral bodies is noted with associated mild degenerative endplate changes. There is no other  compression, acute fracture, subluxation, radiodense foreign bodies, worrisome bone destruction, or pathologic calcifications identified. The sacroiliac joints are intact. NOTHING ACUTE IDENTIFIED.        EKG: TRACING REVIEWED    TREATMENT PLAN:    Risk Management:  close watch and suicide risk    Collateral Information:  Will obtain collateral information from the family or friends. Will obtain medical records as appropriate from out patient providers  Will consult the hospitalist for a physical exam to rule out any co-morbid physical condition. Home medication Reconciled       New Medications started during this admission :    See orders  Prn Haldol 5mg and Vistaril 50mg q6hr for extreme agitation. Trazodone as ordered for insomnia  Vistaril as ordered for anxiety  Discussed with the patient risk, benefit, alternative and common side effects for the  proposed medication treatment. Patient is consenting to the treatment. Psychotherapy:   Encourage participation in milieu and group therapy  Individual therapy as needed        Behavioral Services  Medicare Certification      Admission Day 1  I certify that this patient's inpatient psychiatric hospital admission is medically necessary for:     (1) treatment which could reasonably be expected to improve this patient's condition, or     (2) diagnostic study or its equivalent.          Electronically signed by Marcin Green MD on 5/6/2019 at 5:17 PM

## 2019-05-07 LAB
AMPHETAMINE SCREEN, URINE: ABNORMAL
BARBITURATE SCREEN URINE: ABNORMAL
BENZODIAZEPINE SCREEN, URINE: ABNORMAL
CANNABINOID SCREEN URINE: POSITIVE
COCAINE METABOLITE SCREEN URINE: ABNORMAL
Lab: ABNORMAL
OPIATE SCREEN URINE: ABNORMAL
PHENCYCLIDINE SCREEN URINE: ABNORMAL

## 2019-05-07 PROCEDURE — 99232 SBSQ HOSP IP/OBS MODERATE 35: CPT | Performed by: PSYCHIATRY & NEUROLOGY

## 2019-05-07 PROCEDURE — 1240000000 HC EMOTIONAL WELLNESS R&B

## 2019-05-07 PROCEDURE — 80307 DRUG TEST PRSMV CHEM ANLYZR: CPT

## 2019-05-07 PROCEDURE — 6370000000 HC RX 637 (ALT 250 FOR IP): Performed by: NURSE PRACTITIONER

## 2019-05-07 PROCEDURE — 6370000000 HC RX 637 (ALT 250 FOR IP): Performed by: PSYCHIATRY & NEUROLOGY

## 2019-05-07 RX ORDER — IBUPROFEN 400 MG/1
400 TABLET ORAL EVERY 6 HOURS PRN
Status: DISCONTINUED | OUTPATIENT
Start: 2019-05-07 | End: 2019-05-10 | Stop reason: HOSPADM

## 2019-05-07 RX ADMIN — ACETAMINOPHEN 650 MG: 325 TABLET ORAL at 00:04

## 2019-05-07 RX ADMIN — AMOXICILLIN AND CLAVULANATE POTASSIUM 1 TABLET: 875; 125 TABLET, FILM COATED ORAL at 20:05

## 2019-05-07 RX ADMIN — ACETAMINOPHEN 650 MG: 325 TABLET ORAL at 10:01

## 2019-05-07 RX ADMIN — IBUPROFEN 400 MG: 400 TABLET, FILM COATED ORAL at 19:44

## 2019-05-07 RX ADMIN — DIPHENHYDRAMINE HYDROCHLORIDE 50 MG: 50 CAPSULE ORAL at 00:04

## 2019-05-07 RX ADMIN — OLANZAPINE 5 MG: 5 TABLET, FILM COATED ORAL at 20:05

## 2019-05-07 RX ADMIN — DIPHENHYDRAMINE HYDROCHLORIDE 50 MG: 50 CAPSULE ORAL at 17:00

## 2019-05-07 RX ADMIN — ACETAMINOPHEN 650 MG: 325 TABLET ORAL at 17:00

## 2019-05-07 RX ADMIN — OXCARBAZEPINE 300 MG: 300 TABLET, FILM COATED ORAL at 10:01

## 2019-05-07 RX ADMIN — AMOXICILLIN AND CLAVULANATE POTASSIUM 1 TABLET: 875; 125 TABLET, FILM COATED ORAL at 10:01

## 2019-05-07 RX ADMIN — IBUPROFEN 400 MG: 400 TABLET, FILM COATED ORAL at 12:20

## 2019-05-07 ASSESSMENT — PAIN SCALES - GENERAL
PAINLEVEL_OUTOF10: 5
PAINLEVEL_OUTOF10: 6
PAINLEVEL_OUTOF10: 5
PAINLEVEL_OUTOF10: 7
PAINLEVEL_OUTOF10: 6
PAINLEVEL_OUTOF10: 4
PAINLEVEL_OUTOF10: 6

## 2019-05-07 NOTE — PROGRESS NOTES
Pt asks this writer about being here 3 days and the pink slip. Patient is encouraged to follow Dr. Lu Berry recommendations regarding his stay here. Pt states he does not want the courts involved. Pt denies SI/HI or AVH at this time. Patient comes out of room colors, and provides urine specimen. Pt is polite, cooperative with care. Pt states \"I just want to get my life together and I am tired of people not trust in me when I try to do good. \"  Patient is encouraged to repent for whatever he has done wrong in the past and to live from this day forward and do his best and give the people he loves some time to rebuild their trust in him. Patient does ask this nurse about his Naltrexone and other meds. Pt also states that he takes his Trileptal at 2 and night time.   Pt encouraged to discuss these with the  Electronically signed by Jones Murillo LPN on 1/9/7888 at 3:67 PM

## 2019-05-07 NOTE — GROUP NOTE
Group Therapy Note    Date: May 6    Group Start Time: 0830  Group End Time: 0900  Group Topic: Wrap-Up    [facility] 3[facility] TANNA Parsons    Patient did not attend Wrap-Up Group, despite staff encouragement.         Signature:  Ava Parsons

## 2019-05-07 NOTE — PROGRESS NOTES
Pt c/o pain unrelieved by tylenol. Request to hospitalist for further intervention. Review of pt with Yanira Jonas NP- informed by hospitalist that no intervention for pt unless pt provides tox screen which pt refuses. Will continue to monitor pt and assess further needs.

## 2019-05-07 NOTE — PROGRESS NOTES
Patient up to desk requesting something for pain and anxiety. Tylenol and benadryl given. Patient back up to desk within 30 minutes complaining of restless legs, pain, feeling hot, not feeling right. Offered patient warm shower .  Patient took shower and returned to room, will continue to monitor

## 2019-05-07 NOTE — PROGRESS NOTES
Patient has multiple abrasions on bilateral hands/elbows/arms/legs/knees. Pt has superficial laceration on right cheek and one on nose. Pt has stitches near right eyebrow. Pt has a swollen right eye. Pt has bruising on eyes/nose. No s/s of infection seen.  Electronically signed by Nate Monreal RN on 5/7/19 at 11:07 AM

## 2019-05-07 NOTE — PROGRESS NOTES
BEHAVIORAL HEALTH FOLLOW-UP NOTE     5/7/2019     Patient was seen and examined in person, Chart reviewed   Patient's case discussed with staff/team    Chief Complaint: depression    Interim History:     Pt has been minimizing the circumstances  He also has been denying addicted to alcohol or drugs  Does not remember how he sustained the injury  Pt has been discharge focused  Pt denies any active SI  Acknowledge that he has h/o bipolar disorder  Appetite:   [] Normal/Unchanged  [] Increased  [x] Decreased      Sleep:       [] Normal/Unchanged  [x] Fair       [] Poor              Energy:    [] Normal/Unchanged  [] Increased  [x] Decreased        SI [] Present  [x] Absent    HI  []Present  [x] Absent     Aggression:  [] yes  [] no    Patient is [] able  [x] unable to CONTRACT FOR SAFETY     PAST MEDICAL/PSYCHIATRIC HISTORY:   Past Medical History:   Diagnosis Date    Asthma     Headache        FAMILY/SOCIAL HISTORY:  History reviewed. No pertinent family history. Social History     Socioeconomic History    Marital status: Single     Spouse name: Not on file    Number of children: Not on file    Years of education: Not on file    Highest education level: Not on file   Occupational History    Not on file   Social Needs    Financial resource strain: Not on file    Food insecurity:     Worry: Not on file     Inability: Not on file    Transportation needs:     Medical: Not on file     Non-medical: Not on file   Tobacco Use    Smoking status: Current Some Day Smoker     Packs/day: 0.50     Years: 10.00     Pack years: 5.00     Types: Cigarettes     Start date: 4/11/2006    Smokeless tobacco: Former User     Types: Chew   Substance and Sexual Activity    Alcohol use:  Yes     Alcohol/week: 1.2 oz     Types: 2 Glasses of wine per week     Comment: Not daily and occasionally    Drug use: Not Currently     Comment: H/O cocaine and THC none in 4-6 months per pt    Sexual activity: Not on file     Comment: Pt refused to answer   Lifestyle    Physical activity:     Days per week: Not on file     Minutes per session: Not on file    Stress: Not on file   Relationships    Social connections:     Talks on phone: Not on file     Gets together: Not on file     Attends Congregation service: Not on file     Active member of club or organization: Not on file     Attends meetings of clubs or organizations: Not on file     Relationship status: Not on file    Intimate partner violence:     Fear of current or ex partner: Not on file     Emotionally abused: Not on file     Physically abused: Not on file     Forced sexual activity: Not on file   Other Topics Concern    Not on file   Social History Narrative    Not on file           ROS:  [x] All negative/unchanged except if checked.  Explain positive(checked items) below:  [] Constitutional  [] Eyes  [] Ear/Nose/Mouth/Throat  [] Respiratory  [] CV  [] GI  []   [] Musculoskeletal  [] Skin/Breast  [] Neurological  [] Endocrine  [] Heme/Lymph  [] Allergic/Immunologic    Explanation:     MEDICATIONS:    Current Facility-Administered Medications:     ibuprofen (ADVIL;MOTRIN) tablet 400 mg, 400 mg, Oral, Q6H PRN, Laury Hopkins, APRN - CNP    OLANZapine (ZYPREXA) tablet 5 mg, 5 mg, Oral, Nightly, Pablo Mcdaniels MD, 5 mg at 05/06/19 2106    OXcarbazepine (TRILEPTAL) tablet 300 mg, 300 mg, Oral, Daily, Rosa Isela Mcneil MD, 300 mg at 05/07/19 1001    acetaminophen (TYLENOL) tablet 650 mg, 650 mg, Oral, Q4H PRN, Rosa Isela Mcneil MD, 650 mg at 05/07/19 1001    magnesium hydroxide (MILK OF MAGNESIA) 400 MG/5ML suspension 30 mL, 30 mL, Oral, Daily PRN, Alicia Mcneil MD    nicotine (NICODERM CQ) 21 MG/24HR 1 patch, 1 patch, Transdermal, Daily, Rosa Isela Mcneil MD, 1 patch at 05/07/19 1002    haloperidol lactate (HALDOL) injection 5 mg, 5 mg, Intramuscular, Q6H PRN **OR** haloperidol (HALDOL) tablet 5 mg, 5 mg, Oral, Q6H PRN, Neal Cuellar MD, 5 mg at 05/06/19 1640    diphenhydrAMINE (BENADRYL) injection 50 mg, 50 mg, Intramuscular, Q6H PRN **OR** diphenhydrAMINE (BENADRYL) capsule 50 mg, 50 mg, Oral, Q6H PRN, Imelda Mcneil MD, 50 mg at 05/07/19 0004    traZODone (DESYREL) tablet 50 mg, 50 mg, Oral, Nightly PRN, Subhash Patricio MD, 50 mg at 05/06/19 2106    amoxicillin-clavulanate (AUGMENTIN) 875-125 MG per tablet 1 tablet, 1 tablet, Oral, 2 times per day, Subhash Patricio MD, 1 tablet at 05/07/19 1001      Examination:  /65   Pulse 118   Temp 97 °F (36.1 °C) (Oral)   Resp 18   Ht 6' 1\" (1.854 m)   Wt 180 lb (81.6 kg)   SpO2 98%   BMI 23.75 kg/m²   Gait - steady  Medication side effects(SE): no    Mental Status Examination:    Level of consciousness:  within normal limits   Appearance:  fair grooming and poor hygiene  Behavior/Motor:  psychomotor retardation  Attitude toward examiner:  cooperative  Speech:  well articulated   Mood: constricted  Affect:  intense  Thought processes:  rapid   Thought content:  Suicidal Ideation:  denies suicidal ideation  Perceptual Disturbance:  denies any perceptual disturbance  Cognition:  oriented to person, place, and time   Concentration poor  Insight poor   Judgement poor     ASSESSMENT:   Patient symptoms are:  [] Well controlled  [] Improving  [] Worsening  [x] No change      Diagnosis:   Active Problems:    B[facility] disorder ([facility]  Resolved Problems:    * No resolved hospital problems.  *      LABS:    Recent Labs     05/05/19  1133   WBC 11.4*   HGB 14.6        Recent Labs     05/05/19  1132      K 4.9      CO2 24   BUN 11   CREATININE 0.90   GLUCOSE 71     Recent Labs     05/05/19  1132   BILITOT 0.5   ALKPHOS 55   AST 33   ALT 24     Lab Results   Component Value Date    ETOH 49 05/05/2019     Lab Results   Component Value Date    TSH 1.680 05/05/2019     No results found for: LITHIUM  No results found for: VALPROATE, CBMZ    RISK ASSESSMENT:

## 2019-05-07 NOTE — PROGRESS NOTES
Group Therapy Note    Date: 5/7/19  Start Time: 0578  End Time:  5380    Number of Participants:7    Type of Group: Psychoeducation     Patient's Goal:  To participate in mood management group. Notes: Patient declined to attend psychoeducation group at  despite encouragement by staff.      Discipline Responsible: /Counselor    JUAN Rowland

## 2019-05-07 NOTE — PROGRESS NOTES
Pt is not bothered for am meds, will allow to sleep then approach later.  Electronically signed by Leonardo Wilson LPN on 9/9/2474 at 7:63 AM

## 2019-05-07 NOTE — PROGRESS NOTES
Cup placed in pt's bathroom. Informed pt that we need a urine sample. Pt verbalized understanding.  Electronically signed by Carlos Eduardo Nunez RN on 5/7/19 at 11:27 AM

## 2019-05-07 NOTE — GROUP NOTE
Group Therapy Note    Date: May 6    Group Start Time: 0800  Group End Time: 0830  Group Topic: Recreational    [facility]    Jennifer Gr    Patient did not attend Activity Group, despite staff encouragement.           Signature:  Jennifer Gr

## 2019-05-08 PROCEDURE — 6370000000 HC RX 637 (ALT 250 FOR IP): Performed by: PSYCHIATRY & NEUROLOGY

## 2019-05-08 PROCEDURE — 1240000000 HC EMOTIONAL WELLNESS R&B

## 2019-05-08 PROCEDURE — 99232 SBSQ HOSP IP/OBS MODERATE 35: CPT | Performed by: PSYCHIATRY & NEUROLOGY

## 2019-05-08 PROCEDURE — 6370000000 HC RX 637 (ALT 250 FOR IP): Performed by: NURSE PRACTITIONER

## 2019-05-08 RX ORDER — OLANZAPINE 10 MG/1
10 TABLET ORAL NIGHTLY
Status: DISCONTINUED | OUTPATIENT
Start: 2019-05-08 | End: 2019-05-10 | Stop reason: HOSPADM

## 2019-05-08 RX ADMIN — DIPHENHYDRAMINE HYDROCHLORIDE 50 MG: 50 CAPSULE ORAL at 20:31

## 2019-05-08 RX ADMIN — IBUPROFEN 400 MG: 400 TABLET, FILM COATED ORAL at 08:37

## 2019-05-08 RX ADMIN — AMOXICILLIN AND CLAVULANATE POTASSIUM 1 TABLET: 875; 125 TABLET, FILM COATED ORAL at 20:31

## 2019-05-08 RX ADMIN — IBUPROFEN 400 MG: 400 TABLET, FILM COATED ORAL at 20:31

## 2019-05-08 RX ADMIN — AMOXICILLIN AND CLAVULANATE POTASSIUM 1 TABLET: 875; 125 TABLET, FILM COATED ORAL at 08:37

## 2019-05-08 RX ADMIN — OXCARBAZEPINE 300 MG: 300 TABLET, FILM COATED ORAL at 08:37

## 2019-05-08 RX ADMIN — OLANZAPINE 10 MG: 10 TABLET, FILM COATED ORAL at 20:31

## 2019-05-08 ASSESSMENT — PAIN SCALES - GENERAL
PAINLEVEL_OUTOF10: 6
PAINLEVEL_OUTOF10: 5

## 2019-05-08 NOTE — GROUP NOTE
Group Therapy Note    Date: May 8    Group Start Time: 1110  Group End Time: 7308  Group Topic: Psychotherapy    [facility]    Leon Glover 54        Group Therapy Note    Attendees: 9         Patient's Goal:  To feel better    Notes:   To work on getting out    Status After Intervention:  Unchanged    Participation Level: Interactive    Participation Quality: Appropriate      Speech:  normal      Thought Process/Content: Logical      Affective Functioning: Congruent      Mood: anxious      Level of consciousness:  Alert      Response to Learning: Progressing to goal      Endings: None Reported    Modes of Intervention: Support      Discipline Responsible: /Counselor      Signature:  Leon Glover

## 2019-05-08 NOTE — GROUP NOTE
Group Therapy Note    Date: May 7    Group Start Time: 2045  Group End Time: 2100  Group Topic: Wrap-Up    [facility]    Nadeem Shell        Group Therapy Note    Attendees: 3    Pt did not attend group.

## 2019-05-08 NOTE — PROGRESS NOTES
Pt given Motrin with morning meds for pain and swelling neck and head area.  Electronically signed by Jasmyne Don LPN on 4/5/7981 at 7:26 AM

## 2019-05-08 NOTE — GROUP NOTE
Group Therapy Note    Date: May 7    Group Start Time: 1950  Group End Time: 2045  Group Topic: Recreational    [facility]    [facility]        Group Therapy Note    Attendees: 3    Pt did not attend group.

## 2019-05-08 NOTE — PROGRESS NOTES
Pt denies all, no SI/HI or AVH. Patient out for breakfast, then returns to room to lay down. Patient given Motrin for pain and swelling of the face and neck pain. Patient polite, cooperative. Patient sad, flat affect. Pt tangential.  Speech is monotone, without feeling. Pt says \"whatever\" a lot. Sleep was ok and appetite is good. Pt encouraged to shower.  Electronically signed by Elsa Olson LPN on 8/7/4481 at 2:78 AM

## 2019-05-08 NOTE — PROGRESS NOTES
to answer   Lifestyle    Physical activity:     Days per week: Not on file     Minutes per session: Not on file    Stress: Not on file   Relationships    Social connections:     Talks on phone: Not on file     Gets together: Not on file     Attends Baptism service: Not on file     Active member of club or organization: Not on file     Attends meetings of clubs or organizations: Not on file     Relationship status: Not on file    Intimate partner violence:     Fear of current or ex partner: Not on file     Emotionally abused: Not on file     Physically abused: Not on file     Forced sexual activity: Not on file   Other Topics Concern    Not on file   Social History Narrative    Not on file           ROS:  [x] All negative/unchanged except if checked.  Explain positive(checked items) below:  [] Constitutional  [] Eyes  [] Ear/Nose/Mouth/Throat  [] Respiratory  [] CV  [] GI  []   [] Musculoskeletal  [] Skin/Breast  [] Neurological  [] Endocrine  [] Heme/Lymph  [] Allergic/Immunologic    Explanation:     MEDICATIONS:    Current Facility-Administered Medications:     ibuprofen (ADVIL;MOTRIN) tablet 400 mg, 400 mg, Oral, Q6H PRN, KAYLEIGH Muhammad - CNP, 400 mg at 05/08/19 0837    OLANZapine (ZYPREXA) tablet 5 mg, 5 mg, Oral, Nightly, Ken Villanueva MD, 5 mg at 05/07/19 2005    OXcarbazepine (TRILEPTAL) tablet 300 mg, 300 mg, Oral, Daily, Rosa Isela Mcneil MD, 300 mg at 05/08/19 0837    acetaminophen (TYLENOL) tablet 650 mg, 650 mg, Oral, Q4H PRN, Rosa Isela Mcneil MD, 650 mg at 05/07/19 1700    magnesium hydroxide (MILK OF MAGNESIA) 400 MG/5ML suspension 30 mL, 30 mL, Oral, Daily PRN, Zofia Mcneil MD    nicotine (NICODERM CQ) 21 MG/24HR 1 patch, 1 patch, Transdermal, Daily, El Lea MD, 1 patch at 05/08/19 0837    haloperidol lactate (HALDOL) injection 5 mg, 5 mg, Intramuscular, Q6H PRN **OR** haloperidol (HALDOL) tablet 5 mg, 5 mg, Oral, Q6H PRN, Zofia Margareth

## 2019-05-08 NOTE — GROUP NOTE
Group Therapy Note    Date: May 8    Group Start Time: 1440  Group End Time: 1510  Group Topic: Cognitive Skills    [facility]    JUAN Be        Group Therapy Note             Patient's Goal:  To participate in mood management group. Notes:  Patient learned to set specific goals. Status After Intervention:  Unchanged    Participation Level: Active Listener    Participation Quality: Appropriate      Speech:  normal      Thought Process/Content: Logical      Affective Functioning: Congruent      Mood: depressed      Level of consciousness:  Alert      Response to Learning: Able to verbalize current knowledge/experience      Endings: None Reported    Modes of Intervention: Education      Discipline Responsible: /Counselor      Signature:   JUAN Be

## 2019-05-09 PROCEDURE — 6370000000 HC RX 637 (ALT 250 FOR IP): Performed by: PSYCHIATRY & NEUROLOGY

## 2019-05-09 PROCEDURE — 6370000000 HC RX 637 (ALT 250 FOR IP): Performed by: NURSE PRACTITIONER

## 2019-05-09 PROCEDURE — 90833 PSYTX W PT W E/M 30 MIN: CPT | Performed by: PSYCHIATRY & NEUROLOGY

## 2019-05-09 PROCEDURE — 1240000000 HC EMOTIONAL WELLNESS R&B

## 2019-05-09 PROCEDURE — 99231 SBSQ HOSP IP/OBS SF/LOW 25: CPT | Performed by: PSYCHIATRY & NEUROLOGY

## 2019-05-09 RX ADMIN — AMOXICILLIN AND CLAVULANATE POTASSIUM 1 TABLET: 875; 125 TABLET, FILM COATED ORAL at 20:19

## 2019-05-09 RX ADMIN — IBUPROFEN 400 MG: 400 TABLET, FILM COATED ORAL at 20:18

## 2019-05-09 RX ADMIN — DIPHENHYDRAMINE HYDROCHLORIDE 50 MG: 50 CAPSULE ORAL at 23:24

## 2019-05-09 RX ADMIN — AMOXICILLIN AND CLAVULANATE POTASSIUM 1 TABLET: 875; 125 TABLET, FILM COATED ORAL at 09:35

## 2019-05-09 RX ADMIN — OXCARBAZEPINE 300 MG: 300 TABLET, FILM COATED ORAL at 09:35

## 2019-05-09 RX ADMIN — OLANZAPINE 10 MG: 10 TABLET, FILM COATED ORAL at 20:18

## 2019-05-09 RX ADMIN — DIPHENHYDRAMINE HYDROCHLORIDE 50 MG: 50 CAPSULE ORAL at 12:27

## 2019-05-09 ASSESSMENT — PAIN SCALES - GENERAL: PAINLEVEL_OUTOF10: 4

## 2019-05-09 NOTE — GROUP NOTE
Group Therapy Note    Date: May 9    Group Start Time: 1115  Group End Time: 1200  Group Topic: Psychotherapy    [facility] Mcfarlane, [facility]        Group Therapy Note    Attendees: 10         Patient's Goal:  Not sure     Notes:  Patient is focused  on discharge    Status After Intervention:  Unchanged    Participation Level: Minimal    Participation Quality: Attentive      Speech:  normal      Thought Process/Content: Logical      Affective Functioning: Flat      Mood: anxious      Level of consciousness:  Alert      Response to Learning: Progressing to goal      Endings: None Reported    Modes of Intervention: Support      Discipline Responsible: /Counselor      Signature:  Avinash Mcfarlane, [facility]

## 2019-05-09 NOTE — PROGRESS NOTES
[facility] FOLLOW-UP NOTE     5/9/2019     Patient was seen and examined in person, Chart reviewed   Patient's case discussed with staff/team    Chief Complaint: depression    Interim History:   Pt is doing better  Slept better last night  Mood better  Pt is planing to go to [employer] or [employer]  Pt denies active SI  Upset about his mom not willing to have him home    Appetite:   [] Normal/Unchanged  [] Increased  [x] Decreased      Sleep:       [] Normal/Unchanged  [x] Fair       [] Poor              Energy:    [] Normal/Unchanged  [] Increased  [x] Decreased        SI [] Present  [x] Absent    HI  []Present  [x] Absent     Aggression:  [] yes  [] no    Patient is [x] able  [] unable to CONTRACT FOR SAFETY     PAST MEDICAL/PSYCHIATRIC HISTORY:   Past Medical History:   Diagnosis Date    Asthma     Headache        FAMILY/SOCIAL HISTORY:  History reviewed. No pertinent family history. Social History     Socioeconomic History    Marital status: Single     Spouse name: Not on file    Number of children: Not on file    Years of education: Not on file    Highest education level: Not on file   Occupational History    Not on file   Social Needs    Financial resource strain: Not on file    Food insecurity:     Worry: Not on file     Inability: Not on file    Transportation needs:     Medical: Not on file     Non-medical: Not on file   Tobacco Use    Smoking status: Current Some Day Smoker     Packs/day: 0.50     Years: 10.00     Pack years: 5.00     Types: Cigarettes     Start date: 4/11/2006    Smokeless tobacco: Former User     Types: Chew   Substance and Sexual Activity    Alcohol use:  Yes     Alcohol/week: 1.2 oz     Types: 2 Glasses of wine per week     Comment: Not daily and occasionally    Drug use: Not Currently     Comment: H/O cocaine and THC none in 4-6 months per pt    Sexual activity: Not on file     Comment: Pt refused to answer   Lifestyle    Physical activity:     Days per week: Not on file     Minutes per session: Not on file    Stress: Not on file   Relationships    Social connections:     Talks on phone: Not on file     Gets together: Not on file     Attends Pentecostalism service: Not on file     Active member of club or organization: Not on file     Attends meetings of clubs or organizations: Not on file     Relationship status: Not on file    Intimate partner violence:     Fear of current or ex partner: Not on file     Emotionally abused: Not on file     Physically abused: Not on file     Forced sexual activity: Not on file   Other Topics Concern    Not on file   Social History Narrative    Not on file           ROS:  [x] All negative/unchanged except if checked.  Explain positive(checked items) below:  [] Constitutional  [] Eyes  [] Ear/Nose/Mouth/Throat  [] Respiratory  [] CV  [] GI  []   [] Musculoskeletal  [] Skin/Breast  [] Neurological  [] Endocrine  [] Heme/Lymph  [] Allergic/Immunologic    Explanation:     MEDICATIONS:    Current Facility-Administered Medications:     OLANZapine (ZYPREXA) tablet 10 mg, 10 mg, Oral, Nightly, Jerad Muñiz MD, 10 mg at 05/08/19 2031    ibuprofen (ADVIL;MOTRIN) tablet 400 mg, 400 mg, Oral, Q6H PRN, Kelly Libel, APRN - CNP, 400 mg at 05/08/19 2031    OXcarbazepine (TRILEPTAL) tablet 300 mg, 300 mg, Oral, Daily, Rosa Isela Mcneil MD, 300 mg at 05/09/19 0935    acetaminophen (TYLENOL) tablet 650 mg, 650 mg, Oral, Q4H PRN, Rosa Isela Mcneil MD, 650 mg at 05/07/19 1700    magnesium hydroxide (MILK OF MAGNESIA) 400 MG/5ML suspension 30 mL, 30 mL, Oral, Daily PRN, Carmelina Mcneil MD    nicotine (NICODERM CQ) 21 MG/24HR 1 patch, 1 patch, Transdermal, Daily, Elver Jane MD, 1 patch at 05/09/19 0936    haloperidol lactate (HALDOL) injection 5 mg, 5 mg, Intramuscular, Q6H PRN **OR** haloperidol (HALDOL) tablet 5 mg, 5 mg, Oral, Q6H PRN, Rosa Isela Mcneil MD, 5 mg at 05/06/19 [facility]    diphenhydrAMINE (BENADRYL) injection 50 mg, 50 mg, Intramuscular, Q6H PRN **OR** diphenhydrAMINE (BENADRYL) capsule 50 mg, 50 mg, Oral, Q6H PRN, Derryl Failing MD [place], 50 mg at 05/09/19 1227    traZODone (DESYREL) tablet 50 mg, 50 mg, Oral, Nightly PRN, Derryl Failing MD [place], 50 mg at 05/06/19 2106    amoxicillin-clavulanate (AUGMENTIN) 875-125 MG per tablet 1 tablet, 1 tablet, Oral, 2 times per day, Jose Alejandro Hernandez MD, 1 tablet at 05/09/19 0935      Examination:  BP 98/73   Pulse 111 Comment: RN notified  Temp 97 °F (36.1 °C) (Oral)   Resp 20   Ht 6' 1\" (1.854 m)   Wt 180 lb (81.6 kg)   SpO2 99%   BMI 23.75 kg/m²   Gait - steady  Medication side effects(SE): no    Mental Status Examination:    Level of consciousness:  within normal limits   Appearance:  fair grooming and poor hygiene  Behavior/Motor: less psychomotor retardation  Attitude toward examiner:  cooperative  Speech:  well articulated   Mood: constricted  Affect:  intense  Thought processes:  rapid   Thought content:  Suicidal Ideation:  denies suicidal ideation  Perceptual Disturbance:  denies any perceptual disturbance  Cognition:  oriented to person, place, and time   Concentration better  Insight better  Judgement better    ASSESSMENT:   Patient symptoms are:  [] Well controlled  [] Improving  [] Worsening  [x] No change      Diagnosis:   Active Problems:    Bipolar 1 disorder ([facility]  Resolved Problems:    * No resolved hospital problems. *      LABS:    No results for input(s): WBC, HGB, PLT in the last 72 hours. No results for input(s): NA, K, CL, CO2, BUN, CREATININE, GLUCOSE in the last 72 hours. No results for input(s): BILITOT, ALKPHOS, AST, ALT in the last 72 hours.   Lab Results   Component Value Date    LABAMPH Neg 05/07/2019    BARBSCNU Neg 05/07/2019    LABBENZ Neg 05/07/2019    OPIATESCREENURINE Neg 05/07/2019    PHENCYCLIDINESCREENURINE Neg 05/07/2019    ETOH 49 05/05/2019     Lab Results   Component Value Date    TSH 1.680 05/05/2019     No results found for: LITHIUM  No results found for: VALPROATE, CBMZ    RISK ASSESSMENT:     Treatment Plan:  Reviewed current Medications with the patient. Increase zyprexa to 10 mg  Risks, benefits, side effects, drug-to-drug interactions and alternatives to treatment were discussed. Collateral information  CD evaluation  Encourage patient to attend group and other milieu activities. Discharge planning discussed with the patient and treatment team.    PSYCHOTHERAPY/COUNSELING:  [x] Therapeutic interview  [x] Supportive  [] CBT  [] Ongoing  [] Other  Patient was seen 1:1 for 20 minutes, other than E&M time spent, focusing on      - coping skills techniques     - Anxiety management techniques discussed including deep breathing exercise and PMR     - discussing patients strength and weakness      - Motivational interviewing to assess the stage of change and assessing patient readiness to quit substance use.      - Focusing on negative cognition and maladaptive thoughts, which is feeding and maintaining the depression symptoms    [x] Patient continues to need, on a daily basis, active treatment furnished directly by or requiring the supervision of inpatient psychiatric personnel      Anticipated Length of stay:            Electronically signed by Josette Cope MD on 5/9/2019 at 4:10 PM

## 2019-05-09 NOTE — PROGRESS NOTES
Medicated with 400 mg ibuprofen for Lt lateral chest pain 6/10 and 50 mg benadryl for anxiety. .Electronically signed by Alexa Jones RN on 5/8/19 at 8:34 PM

## 2019-05-09 NOTE — CARE COORDINATION
Patient said he wants to d/c to [facility] so that he can be close to [employer] where he recieves out-patient mental health treatment. SW spoke with intake dept at [facility] @ [phone] who said Patient would need to place a call on the day of his discharge.

## 2019-05-09 NOTE — PROGRESS NOTES
Pt. declined to attend the 0900 community meeting, despite staff encouragement. Electronically signed by Dianne Hickey, [address] on 5/9/2019 at 2:11 PM

## 2019-05-09 NOTE — GROUP NOTE
Group Therapy Note    Date: May 8    Group Start Time: 2055  Group End Time: 2115  Group Topic: Wrap-Up    [facility] Cuellar        Group Therapy Note    Attendees: 10    Pt did not attend group.

## 2019-05-09 NOTE — PROGRESS NOTES
Pt assessment completed. Pt denies all. Evasive on assessment. Back and forth with information. Example when asked about drug use pt states he does not use illegal drugs. Further into conversation pt asked about use of drugs in college and after college, pt states \"I'd just do whatever was around so sometimes cocaine and occasionally heroine. But I \"really\" don't do that now. Pt denies depression/anxiety. Normal appetite and sleep. Pt states he is ready for discharge. Wound clean and intact, only mild pain. No other concerns at this time. Will continue to monitor.

## 2019-05-10 VITALS
BODY MASS INDEX: 23.86 KG/M2 | DIASTOLIC BLOOD PRESSURE: 69 MMHG | OXYGEN SATURATION: 99 % | TEMPERATURE: 97 F | WEIGHT: 180 LBS | SYSTOLIC BLOOD PRESSURE: 107 MMHG | HEART RATE: 91 BPM | HEIGHT: 73 IN | RESPIRATION RATE: 20 BRPM

## 2019-05-10 PROCEDURE — 6370000000 HC RX 637 (ALT 250 FOR IP): Performed by: PSYCHIATRY & NEUROLOGY

## 2019-05-10 PROCEDURE — 99239 HOSP IP/OBS DSCHRG MGMT >30: CPT | Performed by: PSYCHIATRY & NEUROLOGY

## 2019-05-10 RX ORDER — AMOXICILLIN AND CLAVULANATE POTASSIUM 875; 125 MG/1; MG/1
1 TABLET, FILM COATED ORAL EVERY 12 HOURS SCHEDULED
Qty: 4 TABLET | Refills: 0 | Status: SHIPPED | OUTPATIENT
Start: 2019-05-10 | End: 2019-05-12

## 2019-05-10 RX ORDER — OXCARBAZEPINE 150 MG/1
150 TABLET, FILM COATED ORAL 2 TIMES DAILY
Qty: 30 TABLET | Refills: 2 | Status: SHIPPED | OUTPATIENT
Start: 2019-05-10

## 2019-05-10 RX ORDER — OLANZAPINE 10 MG/1
10 TABLET ORAL NIGHTLY
Qty: 15 TABLET | Refills: 2 | Status: SHIPPED | OUTPATIENT
Start: 2019-05-10

## 2019-05-10 RX ADMIN — AMOXICILLIN AND CLAVULANATE POTASSIUM 1 TABLET: 875; 125 TABLET, FILM COATED ORAL at 08:40

## 2019-05-10 RX ADMIN — OXCARBAZEPINE 300 MG: 300 TABLET, FILM COATED ORAL at 08:40

## 2019-05-10 NOTE — PROGRESS NOTES
Pt denies all no SI/HI or AVH. Patient out on the unit social with peers. Pt out watching tv and going to groups. Pt denies any issues at this time. Pt short term memory is not good and he cannot remember coming in and the cirucumstances involving him. Pt bright in appearance states he feels better, and will let this nurse know if he needs pain medication. Pt sleep and appetite are good.  Electronically signed by Roni Platt LPN on 2/91/0138 at 1:41 AM

## 2019-05-10 NOTE — GROUP NOTE
Group Therapy Note    Date: May 10    Group Start Time: 1100  Group End Time: 1200  Group Topic: Group Therapy    [facility]    KAYLEIGH Rojo        Group Therapy Note    Attendees: 14         Patient's Goal:  Discuss relationship issues    Notes:  Spoke of dealing with addiction \"today\". Listened to others speak of relationship issues but offered nothing. Spoke of falling to cause his injuries.     Status After Intervention:  Unchanged    Participation Level: Minimal    Participation Quality: Appropriate and Attentive      Speech:  normal      Thought Process/Content: Logical      Affective Functioning: Congruent      Mood: depressed      Level of consciousness:  Alert      Response to Learning: Progressing to goal      Endings: None Reported    Modes of Intervention: Support, Socialization and Exploration      Discipline Responsible: Registered Nurse      Signature:  KAYLEIGH Rojo

## 2019-05-10 NOTE — GROUP NOTE
Group Therapy Note    Date: May 9    Group Start Time: 1945  Group End Time: 2030  Group Topic: Recreational    [facility] Loera        Group Therapy Note    Attendees: 11      Pt participated in group activity.

## 2019-05-10 NOTE — PROGRESS NOTES
Pt. attended the 0900 community meeting.  Electronically signed by Vamsi Ann on 5/10/2019 at 9:46 AM

## 2019-05-10 NOTE — PROGRESS NOTES
Affect and mood stable  Reviewed and patient verbalized understanding of all instructions  Denied suicidal/homicidal ideation  Belongings returned to patient  Discharged  With father for transport home

## 2019-05-10 NOTE — DISCHARGE SUMMARY
the circumstances of his trauma  Pt not interested in talking  Labile and irritable  Had physical exam which was normal  Denies any active pain     Stressors:Lacks housing. Mother feels Patient's housing has been unstable and somehow contributes to his mental illness and drug use.         Mother said Patient struggles with addiction and mental illness. This has been the case for the past 10 years. Mother said Patient has been diagnosised with bi-polar. Mother feels as if Patient recently relapsed due to his bizarre behavior. Mother said, \"somehting terrible happened\" this past weekend referring to his cuts and bruises. Mother feels as if this is somehow drug related. Mother said Patient did not fall out of a window as he claims. Mother said Patient makes false claims when he is actively psychotic     Patient was staying at the [facility] until December 2018 but was kicked out. He was at [facility] prior to [facility]. He has been bouncing back and forth from his mother's home in [facility] and his father's home in [place]. Mother sighted treatment facilty in [place] where she feels Patient could receive care and assistance with housing.  Mother said Patient was a client at the [facility] in [facility] where he had a case manger named Florinda Abebe.           The patient is not currently receiving care for the above psychiatric illness.     Medications Prior to Admission:     Prescriptions Prior to Admission   Medications Prior to Admission: hydrOXYzine (VISTARIL) 50 MG capsule, Take 50 mg by mouth 3 times daily as needed for Anxiety  OXcarbazepine (TRILEPTAL) 300 MG tablet, Take 300 mg by mouth daily  venlafaxine 150 MG extended release tablet, Take 150 mg by mouth daily (with breakfast)  venlafaxine (EFFEXOR) 100 MG tablet, Take 600 mg by mouth every evening  naltrexone (DEPADE) 50 MG tablet, Take 50 mg by mouth daily        Compliance:no     Psychiatric Review of Systems       Depression: yes     Kathy or Hypomania:  no     Panic Attacks:  no     Phobias:  no     Obsessions and Compulsions:  no     PTSD : no     Hallucinations:  no     Delusions:  no     Substance Abuse History:  ETOH: 2 drinks night before, minimizing alcohol use and past addiction   Marijuana: no  Opiates: no  Other Drugs: no        Past Psychiatric History:  Prior Diagnosis: bipolar disorder  Psychiatrist: yes  Therapist:yes  Hospitalization: yes  Hx of Suicidal Attempts: no  Hx of violence:  no  ECT: no  Previous discontinued Psychiatric Med Trials:             PAST MEDICAL/PSYCHIATRIC HISTORY:   Past Medical History:   Diagnosis Date    Asthma     Headache        FAMILY/SOCIAL HISTORY:  History reviewed. No pertinent family history. Social History     Socioeconomic History    Marital status: Single     Spouse name: Not on file    Number of children: Not on file    Years of education: Not on file    Highest education level: Not on file   Occupational History    Not on file   Social Needs    Financial resource strain: Not on file    Food insecurity:     Worry: Not on file     Inability: Not on file    Transportation needs:     Medical: Not on file     Non-medical: Not on file   Tobacco Use    Smoking status: Current Some Day Smoker     Packs/day: 0.50     Years: 10.00     Pack years: 5.00     Types: Cigarettes     Start date: 4/11/2006    Smokeless tobacco: Former User     Types: Chew   Substance and Sexual Activity    Alcohol use:  Yes     Alcohol/week: 1.2 oz     Types: 2 Glasses of wine per week     Comment: Not daily and occasionally    Drug use: Not Currently     Comment: H/O cocaine and THC none in 4-6 months per pt    Sexual activity: Not on file     Comment: Pt refused to answer   Lifestyle    Physical activity:     Days per week: Not on file     Minutes per session: Not on file    Stress: Not on file   Relationships    Social connections:     Talks on phone: Not on file     Gets together: Not on file     Attends [employer] service: Not on file     Active member of club or organization: Not on file     Attends meetings of clubs or organizations: Not on file     Relationship status: Not on file    Intimate partner violence:     Fear of current or ex partner: Not on file     Emotionally abused: Not on file     Physically abused: Not on file     Forced sexual activity: Not on file   Other Topics Concern    Not on file   Social History Narrative    Not on file       MEDICATIONS:    Current Facility-Administered Medications:     OLANZapine (ZYPREXA) tablet 10 mg, 10 mg, Oral, Nightly, Anh Lange MD, 10 mg at 05/09/19 2018    ibuprofen (ADVIL;MOTRIN) tablet 400 mg, 400 mg, Oral, Q6H PRN, KAYLEIGH Nassar - CNP, 400 mg at 05/09/19 2018    OXcarbazepine (TRILEPTAL) tablet 300 mg, 300 mg, Oral, Daily, Amita Martinez MD, 300 mg at 05/10/19 0840    acetaminophen (TYLENOL) tablet 650 mg, 650 mg, Oral, Q4H PRN, Amita Martinez MD, 650 mg at 05/07/19 1700    magnesium hydroxide (MILK OF MAGNESIA) 400 MG/5ML suspension 30 mL, 30 mL, Oral, Daily PRN, Amita Martinez MD    nicotine (NICODERM CQ) 21 MG/24HR 1 patch, 1 patch, Transdermal, Daily, Amita Martinez MD, 1 patch at 05/10/19 0840    haloperidol lactate (HALDOL) injection 5 mg, 5 mg, Intramuscular, Q6H PRN **OR** haloperidol (HALDOL) tablet 5 mg, 5 mg, Oral, Q6H PRN, Amita Martinez MD, 5 mg at 05/06/19 1640    diphenhydrAMINE (BENADRYL) injection 50 mg, 50 mg, Intramuscular, Q6H PRN **OR** diphenhydrAMINE (BENADRYL) capsule 50 mg, 50 mg, Oral, Q6H PRN, Amita Martinez MD, 50 mg at 05/09/19 3374    traZODone (DESYREL) tablet 50 mg, 50 mg, Oral, Nightly PRN, Amita Martinez MD, 50 mg at 05/06/19 2106    amoxicillin-clavulanate (AUGMENTIN) 875-125 MG per tablet 1 tablet, 1 tablet, Oral, 2 times per day, Amita Martinez MD, 1 tablet at 05/10/19 0840    Examination:  /69   Pulse 91   Temp 97 °F input(s): WBC, HGB, PLT in the last 72 hours. No results for input(s): NA, K, CL, CO2, BUN, CREATININE, GLUCOSE in the last 72 hours. No results for input(s): BILITOT, ALKPHOS, AST, ALT in the last 72 hours. Lab Results   Component Value Date    LABAMPH Neg 05/07/2019    BARBSCNU Neg 05/07/2019    LABBENZ Neg 05/07/2019    OPIATESCREENURINE Neg 05/07/2019    PHENCYCLIDINESCREENURINE Neg 05/07/2019    ETOH 49 05/05/2019     Lab Results   Component Value Date    TSH 1.680 05/05/2019     No results found for: LITHIUM  No results found for: VALPROATE, CBMZ    RISK ASSESSMENT AT DISCHARGE: Low risk for suicide and homicide. Treatment Plan:  Reviewed current Medications with the patient. Education provided on the complaince with treatment. Risks, benefits, side effects, drug-to-drug interactions and alternatives to treatment were discussed. Encourage patient to attend outpatient follow up appointment and therapy. Patient was advised to call the outpatient provider, visit the nearest ED or call 911 if symptoms are not manageable.      Patient's family member was contacted prior to the discharge.         Medication List      START taking these medications    amoxicillin-clavulanate 875-125 MG per tablet  Commonly known as:  AUGMENTIN  Take 1 tablet by mouth every 12 hours for 2 days     OLANZapine 10 MG tablet  Commonly known as:  ZYPREXA  Take 1 tablet by mouth nightly        CHANGE how you take these medications    OXcarbazepine 150 MG tablet  Commonly known as:  TRILEPTAL  Take 1 tablet by mouth 2 times daily  What changed:    · medication strength  · how much to take  · when to take this        STOP taking these medications    cyclobenzaprine 10 MG tablet  Commonly known as:  FLEXERIL     hydrOXYzine 50 MG capsule  Commonly known as:  VISTARIL     naltrexone 50 MG tablet  Commonly known as:  DEPADE     naproxen 500 MG tablet  Commonly known as:  NAPROSYN     venlafaxine 100 MG tablet  Commonly known as: EFFEXOR     venlafaxine 150 MG extended release tablet           Where to Get Your Medications      Information about where to get these medications is not yet available    Ask your nurse or doctor about these medications  · amoxicillin-clavulanate 875-125 MG per tablet  · OLANZapine 10 MG tablet  · OXcarbazepine 150 MG tablet           TIME SPEND - 35 MINUTES TO COMPLETE THE EVALUATION, DISCHARGE SUMMARY, MEDICATION RECONCILIATION AND FOLLOW UP CARE     Jan Mustafa  5/10/2019  9:42 AM